# Patient Record
Sex: FEMALE | Race: WHITE | Employment: OTHER | URBAN - METROPOLITAN AREA
[De-identification: names, ages, dates, MRNs, and addresses within clinical notes are randomized per-mention and may not be internally consistent; named-entity substitution may affect disease eponyms.]

---

## 2014-08-01 LAB — COLONOSCOPY, EXTERNAL: NORMAL

## 2015-09-01 LAB — AMB DEXA, EXTERNAL: NORMAL

## 2016-04-01 LAB — MAMMOGRAPHY, EXTERNAL: NORMAL

## 2017-06-22 DIAGNOSIS — E78.00 PURE HYPERCHOLESTEROLEMIA: ICD-10-CM

## 2017-08-23 PROBLEM — R55 NEAR SYNCOPE: Status: ACTIVE | Noted: 2017-08-23

## 2017-08-23 PROBLEM — M85.80 OSTEOPENIA: Status: ACTIVE | Noted: 2017-08-23

## 2017-08-23 PROBLEM — F41.0 PANIC: Status: ACTIVE | Noted: 2017-08-23

## 2017-08-23 PROBLEM — B35.1 ONYCHOMYCOSIS OF TOENAIL: Status: ACTIVE | Noted: 2017-08-23

## 2017-08-23 PROBLEM — R10.9 ABDOMINAL PAIN: Status: ACTIVE | Noted: 2017-08-23

## 2017-08-23 PROBLEM — E78.00 PURE HYPERCHOLESTEROLEMIA: Status: ACTIVE | Noted: 2017-08-23

## 2017-08-23 PROBLEM — L03.032 CELLULITIS OF TOE OF LEFT FOOT: Status: ACTIVE | Noted: 2017-08-23

## 2017-08-23 PROBLEM — M81.0 OSTEOPOROSIS: Status: ACTIVE | Noted: 2017-08-23

## 2017-08-23 PROBLEM — I10 HBP (HIGH BLOOD PRESSURE): Status: ACTIVE | Noted: 2017-08-23

## 2017-08-23 RX ORDER — MUPIROCIN 20 MG/G
OINTMENT TOPICAL 3 TIMES DAILY
COMMUNITY
End: 2019-01-21

## 2017-08-23 RX ORDER — IBANDRONATE SODIUM 150 MG/1
150 TABLET, FILM COATED ORAL
COMMUNITY
End: 2018-07-30 | Stop reason: SDUPTHER

## 2017-08-23 RX ORDER — ASPIRIN 81 MG/1
81 TABLET ORAL DAILY
COMMUNITY
End: 2019-01-21

## 2017-08-23 RX ORDER — SULFAMETHOXAZOLE AND TRIMETHOPRIM 800; 160 MG/1; MG/1
1 TABLET ORAL 2 TIMES DAILY
COMMUNITY
End: 2017-10-10 | Stop reason: ALTCHOICE

## 2017-08-23 RX ORDER — CEFDINIR 300 MG/1
300 CAPSULE ORAL 2 TIMES DAILY
COMMUNITY
End: 2017-10-10 | Stop reason: ALTCHOICE

## 2017-08-23 RX ORDER — LANOLIN ALCOHOL/MO/W.PET/CERES
500 CREAM (GRAM) TOPICAL DAILY
COMMUNITY
End: 2019-01-21

## 2017-08-23 RX ORDER — ALPRAZOLAM 0.25 MG/1
0.25 TABLET ORAL
COMMUNITY
End: 2017-10-10 | Stop reason: SDUPTHER

## 2017-09-07 RX ORDER — AMOXICILLIN AND CLAVULANATE POTASSIUM 500; 125 MG/1; MG/1
1 TABLET, FILM COATED ORAL 2 TIMES DAILY
Qty: 14 TAB | Refills: 0 | Status: SHIPPED | OUTPATIENT
Start: 2017-09-07 | End: 2017-09-14

## 2017-10-05 ENCOUNTER — LAB ONLY (OUTPATIENT)
Dept: INTERNAL MEDICINE CLINIC | Age: 67
End: 2017-10-05

## 2017-10-05 DIAGNOSIS — Z20.5 EXPOSURE TO HEPATITIS C: ICD-10-CM

## 2017-10-05 DIAGNOSIS — Z00.00 ROUTINE GENERAL MEDICAL EXAMINATION AT A HEALTH CARE FACILITY: Primary | ICD-10-CM

## 2017-10-05 DIAGNOSIS — E78.00 PURE HYPERCHOLESTEROLEMIA: ICD-10-CM

## 2017-10-05 LAB
ALBUMIN SERPL-MCNC: 4.5 G/DL (ref 3.9–5.4)
ALKALINE PHOS POC: 95 U/L (ref 38–126)
ALT SERPL-CCNC: 26 U/L (ref 9–52)
AST SERPL-CCNC: 29 U/L (ref 14–36)
BACTERIA UA POCT, BACTPOCT: NORMAL
BILIRUB UR QL STRIP: NEGATIVE
BUN BLD-MCNC: 9 MG/DL (ref 7–17)
CALCIUM BLD-MCNC: 9.7 MG/DL (ref 8.4–10.2)
CASTS UA POCT: NORMAL
CHLORIDE BLD-SCNC: 107 MMOL/L (ref 98–107)
CHOLEST SERPL-MCNC: 210 MG/DL (ref 0–200)
CK (CPK) POC: 81 U/L (ref 30–135)
CLUE CELLS, CLUEPOCT: NEGATIVE
CO2 POC: 26 MMOL/L (ref 22–32)
CREAT BLD-MCNC: 0.6 MG/DL (ref 0.7–1.2)
CRYSTALS UA POCT, CRYSPOCT: NEGATIVE
EGFR (POC): 94.3
EPITHELIAL CELLS POCT: NORMAL
GLUCOSE POC: 100 MG/DL (ref 65–105)
GLUCOSE UR-MCNC: NEGATIVE MG/DL
GRAN# POC: 5.8 K/UL (ref 2–7.8)
GRAN% POC: 79.1 % (ref 37–92)
HCT VFR BLD CALC: 39.4 % (ref 37–51)
HDLC SERPL-MCNC: 70 MG/DL (ref 35–130)
HGB BLD-MCNC: 12.7 G/DL (ref 12–18)
IRON POC: 107 UG/DL (ref 37–170)
IRON SATURATION POC: 26 % (ref 15–55)
KETONES P FAST UR STRIP-MCNC: NEGATIVE MG/DL
LDL CHOLESTEROL POC: 124.2 MG/DL (ref 0–130)
LY# POC: 1.2 K/UL (ref 0.6–4.1)
LY% POC: 18.2 % (ref 10–58.5)
MCH RBC QN: 28.8 PG (ref 26–32)
MCHC RBC-ENTMCNC: 32.1 G/DL (ref 30–36)
MCV RBC: 90 FL (ref 80–97)
MID #, POC: 0.1 K/UL (ref 0–1.8)
MID% POC: 2.7 % (ref 0.1–24)
MUCUS UA POCT, MUCPOCT: NORMAL
PH UR STRIP: 6 [PH] (ref 5–7)
PLATELET # BLD: 282 K/UL (ref 140–440)
POTASSIUM SERPL-SCNC: 4.1 MMOL/L (ref 3.6–5)
PROT SERPL-MCNC: 7 G/DL (ref 6.3–8.2)
PROT UR QL STRIP: NEGATIVE MG/DL
RBC # BLD: 4.39 M/UL (ref 4.2–6.3)
RBC UA POCT, RBCPOCT: NORMAL
SODIUM SERPL-SCNC: 144 MMOL/L (ref 137–145)
SP GR UR STRIP: 1.01 (ref 1.01–1.02)
TCHOL/HDL RATIO (POC): 3 (ref 0–4)
TIBC POC: 418 UG/DL (ref 265–497)
TOTAL BILIRUBIN POC: 0.6 MG/DL (ref 0.2–1.3)
TRICH UA POCT, TRICHPOC: NEGATIVE
TRIGL SERPL-MCNC: 79 MG/DL (ref 0–200)
TSH BLD-ACNC: 2.34 UIU/ML (ref 0.4–4.2)
UA UROBILINOGEN AMB POC: NORMAL (ref 0.2–1)
URINALYSIS CLARITY POC: CLEAR
URINALYSIS COLOR POC: NORMAL
URINE BLOOD POC: NEGATIVE
URINE CULT COMMENT, POCT: NORMAL
URINE LEUKOCYTES POC: NORMAL
URINE NITRITES POC: NEGATIVE
VITAMIN D POC: 26.5 NG/ML (ref 30–96)
VLDLC SERPL CALC-MCNC: 15.8 MG/DL
WBC # BLD: 7.1 K/UL (ref 4.1–10.9)
WBC UA POCT, WBCPOCT: 0
YEAST UA POCT, YEASTPOC: NEGATIVE

## 2017-10-06 LAB — HCV AB S/CO SERPL IA: <0.1 S/CO RATIO (ref 0–0.9)

## 2017-10-10 ENCOUNTER — OFFICE VISIT (OUTPATIENT)
Dept: INTERNAL MEDICINE CLINIC | Age: 67
End: 2017-10-10

## 2017-10-10 VITALS — TEMPERATURE: 97.6 F | HEART RATE: 69 BPM | SYSTOLIC BLOOD PRESSURE: 139 MMHG | DIASTOLIC BLOOD PRESSURE: 92 MMHG

## 2017-10-10 DIAGNOSIS — Z23 ENCOUNTER FOR IMMUNIZATION: ICD-10-CM

## 2017-10-10 DIAGNOSIS — Z00.00 ANNUAL PHYSICAL EXAM: Primary | ICD-10-CM

## 2017-10-10 RX ORDER — ALPRAZOLAM 0.25 MG/1
0.25 TABLET ORAL
Qty: 30 TAB | Refills: 2 | Status: SHIPPED | OUTPATIENT
Start: 2017-10-10 | End: 2018-05-22 | Stop reason: SDUPTHER

## 2017-10-10 NOTE — LETTER
10/10/2017 12:14 PM 
 
Ms. Nestor Hernandez [facility] [id number] Dear Kiel Perez: 
 
I am writing this letter as a follow-up to your recent physical examination. I have enclosed copies of your lab work for your review. After going over this information, if you have any questions please give me a call. Your current active and past medical problems include: 
 
1. Labile/white coat hypertension. 2. Recurrent syncope, for which you've had a negative cardiac evaluation. 3. Osteopenia. 4. Fibrocystic breast disease, for which you've had a right breast biopsy. 5. You have gallstones, which are asymptomatic. 
6. You have a simple cyst in your right kidney. Your current medications are: 1. Xanax 0.25 mg as needed. 2. Aspirin 81 mg daily. 3. Vitamin B12 500 mcg daily. 4. Boniva 150 mg monthly. On physical examination, your height was 5'4\". Your weight was 156. BMI 27. Your blood pressure was 160/80. Your pulse was 70 and regular. Your physical examination was normal except for the multiple skin lesions that were recently treated by your dermatologist.  Your resting electrocardiogram, pulmonary function testing and hearing testing were all normal. 
 
Your laboratory studies revealed your hemoglobin and iron stores to be normal.  Your blood sugar, potassium, kidney function, calcium level and liver function tests were normal.   Total cholesterol was 210, triglycerides were 79, LDL (bad cholesterol) was 124 and HDL (good cholesterol) was 70. This remains a low risk profile, but it is not nearly as good as it was a year ago when you weren't a vegan. I recommend you regain your sanity and start eating a normal diet. Thyroid function is normal.  Vitamin D level is almost normal at 26.5. Please continue your daily supplement of 1,000 units. UA is normal and screening for hepatitis C virus was negative. You are overdue for your mammogram, so go ahead and schedule that.   The day you were in the office we updated your influenza vaccine, gave you the second vaccine to prevent pneumococcal pneumonia, called Pneumovax 23. You had previously had Prevnar 13. That completes the course of treatment to prevent pneumococcal pneumonia. Shingles vaccine was given in 2012 and no booster is recommended for that. Tetanus and whooping cough vaccines are also current. You had a normal colonoscopy in 2014 and repeat was recommended in 2024 since you are low risk. You had a CT scan of the heart in 2012. Your coronary artery calcium score was 0, which is perfect. That does not need to be repeated. After you've taken Boniva for two full years we will repeat your bone density test. 
 
This is an excellent report. You are quite healthy. I look forward to seeing you again in one year or sooner should the need arise. Sincerely, Annetta Martínez MD

## 2017-10-10 NOTE — PROGRESS NOTES
Jorden Perez comes to the office today for a comprehensive medical evaluation. Past Medical History:  1. G2, P2, AB0.  2. Labile/white coat hypertension. 3. Vasovagal syncope x two, status post negative cardiac evaluation. 4. Osteopenia, currently being treated with Boniva. 5. Hypercholesterolemia. 6. Fibrocystic breast disease, status post lumpectomy right breast.  7. Migraine headaches. 8. Asymptomatic cholelithiasis. 9. Simple right renal cyst.    Current Medications:  1. Citracal with vitamin D, one daily  2. Biotin 5,000 units daily. 3. Vitamin B12 500 units daily. 4. Aspirin 81 mg daily. 5. Xanax 0.25 mg prn. She says she rarely takes that. 6. Boniva 150 mg monthly. She's had that for about five months. Drug Allergies:  None. Social History:  She is . She is a social drinker. She is a lifelong nonsmoker. She is a vegan. Family History:  Her mother  in her 97J with complications of heart disease, PVD and COPD. Father  from complications of dementia. She has one sister in good health. No family history of ovarian, breast, colon cancer or colon polyps. Review of Systems:  Jorden Perez is feeling well. She has not had any symptoms of dizziness, syncope or near syncope. She monitors her blood pressure at home and always gets normal readings. She denies exertional chest pain, chest pressure, chest tightness, shortness of breath, PND, orthopnea or ankle edema. Remainder of the ROS is negative. Physical Examination:  GENERAL:  HT: 5'4\". WT: 156. BMI: 27.0. BP: 139/92 initially, subsequently 160/80. P: 70 and regular. T: 97.6. P:  and regular. HEENT:  Head normocephalic, atraumatic. Eyes - pupils midrange, equal directly and consensually. Extraocular movements are normal.  Ears, nose and throat are normal.  NECK:  Without JVD, adenopathy, thyromegaly or carotid bruits. LUNGS:  Clear. BREASTS:  Without masses. HEART:   Quiet precordium. Regular rate and rhythm.   No murmurs or gallops. ABDOMEN:  Soft, non tender. No detectable hepatosplenomegaly, abdominal masses or bruits. EXTREMITIES:  Without edema. Pulses are normal.  SKIN:  She has multiple lesions treated  yesterday by her dermatologist.  NEUROLOGICAL:  Cranial nerves II-XII are intact. Strength, gait and mental status are normal for age. Studies:  EKG - normal sinus rhythm, within normal limits. Hearing testing is normal.  Pulmonary function testing is normal.    Laboratory:  HGB and iron stores are normal. Blood sugar, potassium, kidney function, calcium level and LFTs are normal.  Total cholesterol 210, triglycerides 79, , HDL 70. TSH 2.3. Vitamin D level 26.5. Hep C antibody negative. UA is normal.    Impression:  1. Labile/white coat hypertension. 2. Osteopenia. 3. Fibrocystic breast disease. 4. Asymptomatic cholelithiasis. 5. History of recurrent vasovagal syncope. 6. Simple right renal cyst.    Plan:  1. Continue current medication regimen as outlined. 2. After she's been on Boniva for two years we'll repeat her bone density scan. 3. She had a normal colonoscopy in 2014, repeat recommended in 2024. 4. CT scan of the heart showed a coronary artery calcium score of 0 in 2012. No repeat recommended at this time. 5. Mammogram is due and she will schedule that. 6. Pneumovax 23, influenza vaccine given today. She's had Prevnar 13, Tdap and Zostavax. 7. Follow up annually or sooner prn.

## 2017-10-10 NOTE — PROGRESS NOTES
Reviewed record in preparation for visit and have obtained necessary documentation. Identified pt with two pt identifiers(name and ). Chief Complaint   Patient presents with    Annual Exam        Coordination of Care Questionnaire:  :     1) Have you been to an emergency room, urgent care clinic since your last visit? no    Hospitalized since your last visit? No             2) Have you seen or consulted any other health care providers outside of [address] [place] [address] since your last visit?  No

## 2017-12-20 DIAGNOSIS — J40 BRONCHITIS: Primary | ICD-10-CM

## 2017-12-20 RX ORDER — AZITHROMYCIN 250 MG/1
TABLET, FILM COATED ORAL
Qty: 6 TAB | Refills: 0 | Status: SHIPPED | OUTPATIENT
Start: 2017-12-20 | End: 2017-12-23 | Stop reason: SDUPTHER

## 2017-12-20 RX ORDER — CODEINE PHOSPHATE AND GUAIFENESIN 10; 100 MG/5ML; MG/5ML
5 SOLUTION ORAL
Qty: 240 ML | Refills: 2 | Status: SHIPPED | OUTPATIENT
Start: 2017-12-20 | End: 2018-09-21 | Stop reason: SDUPTHER

## 2017-12-23 DIAGNOSIS — J40 BRONCHITIS: ICD-10-CM

## 2017-12-23 RX ORDER — AZITHROMYCIN 250 MG/1
TABLET, FILM COATED ORAL
Qty: 6 TAB | Refills: 0 | Status: SHIPPED | OUTPATIENT
Start: 2017-12-23 | End: 2017-12-28

## 2018-03-26 ENCOUNTER — OFFICE VISIT (OUTPATIENT)
Dept: INTERNAL MEDICINE CLINIC | Age: 68
End: 2018-03-26

## 2018-03-26 VITALS
TEMPERATURE: 97.9 F | OXYGEN SATURATION: 98 % | SYSTOLIC BLOOD PRESSURE: 154 MMHG | DIASTOLIC BLOOD PRESSURE: 91 MMHG | WEIGHT: 160 LBS | HEART RATE: 87 BPM

## 2018-03-26 DIAGNOSIS — I10 ESSENTIAL HYPERTENSION: Primary | ICD-10-CM

## 2018-03-26 PROBLEM — R42 VERTIGO: Status: ACTIVE | Noted: 2018-03-26

## 2018-03-26 RX ORDER — MECLIZINE HYDROCHLORIDE 25 MG/1
25 TABLET ORAL
Qty: 30 TAB | Refills: 3 | Status: SHIPPED | OUTPATIENT
Start: 2018-03-26 | End: 2018-04-05

## 2018-03-26 NOTE — PROGRESS NOTES
Reviewed record in preparation for visit and have obtained necessary documentation. Identified pt with two pt identifiers(name and ). Chief Complaint   Patient presents with    Dizziness        Coordination of Care Questionnaire:  :     1) Have you been to an emergency room, urgent care clinic since your last visit? No     Hospitalized since your last visit? No               2) Have you seen or consulted any other health care providers outside of [address] [place] [address] since your last visit?  No

## 2018-03-26 NOTE — PROGRESS NOTES
Subjective:  Stewart Wilkes comes to the office today complaining of dizziness. On Saturday night she attended a venue that was quite loud. This resulted in significant tinnitus in both ears. The next day she turned her head quickly and developed acute vertigo. She had to lie down in bed for about 20 minutes, then it resolved. She has noted this on 2-3 other occasions, always in association with abrupt head movement. Physical Examination:  GENERAL: T: 97.9. BP: initially 154/90, subsequently 120/80. P: 80 and regular. O2 sat on room air: 98%. WT: 160. She is awake, alert and oriented x four, in no distress. HEENT:  Eyes - Pupils midrange, equal directly and consensually. EOMs normal.  No nystagmus. Ears are clear. She does have scarring of the TMs from previous tympanotomy tubes. Faces symmetric. NECK:  Without jugular venous distention, adenopathy, thyromegaly or carotid bruits. CHEST:  Lungs clear. CARDIAC:  Heart regular rhythm without murmurs or gallops. NEURO:  Exam is normal.  HEARING:  Hearing testing shows symmetrical high frequency hearing loss in each ear. Impression:  1. Positional vertigo. Plan:  1. I gave her some vestibular exercises to do. 2. Recommended Meclizine 25 mg t.i.d. prn.  3. Follow up as previously scheduled or sooner prn.

## 2018-05-22 DIAGNOSIS — F41.9 ANXIETY: Primary | ICD-10-CM

## 2018-05-22 RX ORDER — ALPRAZOLAM 0.25 MG/1
0.25 TABLET ORAL
Qty: 30 TAB | Refills: 2 | Status: SHIPPED | OUTPATIENT
Start: 2018-05-22 | End: 2019-01-21

## 2018-05-22 NOTE — TELEPHONE ENCOUNTER
Requested Prescriptions     Pending Prescriptions Disp Refills    ALPRAZolam (XANAX) 0.25 mg tablet 30 Tab 2     Sig: Take 1 Tab by mouth three (3) times daily as needed for Anxiety. Max Daily Amount: 0.75 mg.

## 2018-07-12 RX ORDER — CYCLOBENZAPRINE HCL 5 MG
TABLET ORAL
Qty: 30 TAB | Refills: 0 | Status: SHIPPED | OUTPATIENT
Start: 2018-07-12 | End: 2019-01-21

## 2018-07-12 NOTE — TELEPHONE ENCOUNTER
Patient phoned office states lifted heavy pictures yesterday   Experienced spasm in back moving around to chest after lifting   Dr. Melva Lauren sent rx for Flexeril and advised patient to call if no improvement and come in to office to be seen

## 2018-07-30 DIAGNOSIS — J02.9 PHARYNGITIS, UNSPECIFIED ETIOLOGY: Primary | ICD-10-CM

## 2018-07-30 DIAGNOSIS — M81.0 AGE-RELATED OSTEOPOROSIS WITHOUT CURRENT PATHOLOGICAL FRACTURE: ICD-10-CM

## 2018-07-30 RX ORDER — AZITHROMYCIN 250 MG/1
TABLET, FILM COATED ORAL
Qty: 6 TAB | Refills: 0 | Status: SHIPPED | OUTPATIENT
Start: 2018-07-30 | End: 2018-08-04

## 2018-07-30 RX ORDER — IBANDRONATE SODIUM 150 MG/1
150 TABLET, FILM COATED ORAL
Qty: 4 TAB | Refills: 3 | Status: SHIPPED | OUTPATIENT
Start: 2018-07-30 | End: 2019-01-21

## 2018-09-21 ENCOUNTER — OFFICE VISIT (OUTPATIENT)
Dept: INTERNAL MEDICINE CLINIC | Age: 68
End: 2018-09-21

## 2018-09-21 VITALS
HEART RATE: 88 BPM | TEMPERATURE: 98 F | OXYGEN SATURATION: 96 % | DIASTOLIC BLOOD PRESSURE: 76 MMHG | SYSTOLIC BLOOD PRESSURE: 130 MMHG

## 2018-09-21 DIAGNOSIS — R05.9 COUGH: Primary | ICD-10-CM

## 2018-09-21 DIAGNOSIS — J40 BRONCHITIS: ICD-10-CM

## 2018-09-21 DIAGNOSIS — J18.9 COMMUNITY ACQUIRED PNEUMONIA OF LEFT LOWER LOBE OF LUNG: ICD-10-CM

## 2018-09-21 DIAGNOSIS — R07.1 CHEST PAIN ON BREATHING: ICD-10-CM

## 2018-09-21 RX ORDER — CODEINE PHOSPHATE AND GUAIFENESIN 10; 100 MG/5ML; MG/5ML
5 SOLUTION ORAL
Qty: 240 ML | Refills: 2 | Status: SHIPPED | OUTPATIENT
Start: 2018-09-21 | End: 2019-01-21

## 2018-09-21 RX ORDER — LEVOFLOXACIN 750 MG/1
750 TABLET ORAL DAILY
Qty: 5 TAB | Refills: 0 | Status: SHIPPED | OUTPATIENT
Start: 2018-09-21 | End: 2019-01-21 | Stop reason: ALTCHOICE

## 2018-09-21 NOTE — PROGRESS NOTES
Reviewed record in preparation for visit and have obtained necessary documentation. Identified pt with two pt identifiers(name and ). Chief Complaint   Patient presents with    Breathing Problem        Coordination of Care Questionnaire:  :     1) Have you been to an emergency room, urgent care clinic since your last visit? No     Hospitalized since your last visit? No               2) Have you seen or consulted any other health care providers outside of [address] [place] [address] since your last visit?  No

## 2018-09-21 NOTE — PROGRESS NOTES
Subjective:  [address] Lobato has had nasal and sinus congestion, sore throat and cough since Monday, the 17th. This morning she woke up with severe pain in her chest.  It was somewhat pleuritic. She took some Advil and a muscle relaxant. She said it helped. Physical Examination:  GENERAL:  T: 98.5. BP: 130/76. P: 88 and regular. R: 18.  O2 sat on room air: 96%. HEENT:  Unremarkable. NECK:  Without adenopathy or stridor. CHEST:  Lungs clear. CARDIAC:  Heart regular rhythm without murmurs or gallops. Studies:  Chest xray - normal heart size, subsegmental atelectasis or infiltrate at the left base. There is slight eventration of the diaphragm with a left pleural thickening. Impression:  1. I do not have another xray to compare this to. I suspect the changes are chronic, but given her symptomatology I am going treat her for community acquired pneumonia. Plan:  1. Levaquin 750, (#5), one daily. 2. Cheratussin AC as needed for cough. 3. She's appointed for a full review on October 24th.

## 2018-10-17 ENCOUNTER — LAB ONLY (OUTPATIENT)
Dept: INTERNAL MEDICINE CLINIC | Age: 68
End: 2018-10-17

## 2018-10-17 DIAGNOSIS — M81.0 AGE-RELATED OSTEOPOROSIS WITHOUT CURRENT PATHOLOGICAL FRACTURE: Primary | ICD-10-CM

## 2018-10-17 DIAGNOSIS — Z00.00 ANNUAL PHYSICAL EXAM: Primary | ICD-10-CM

## 2018-10-17 LAB
ALBUMIN SERPL-MCNC: 4.8 G/DL (ref 3.9–5.4)
ALKALINE PHOS POC: 81 U/L (ref 38–126)
ALT SERPL-CCNC: 34 U/L (ref 9–52)
AST SERPL-CCNC: 34 U/L (ref 14–36)
BACTERIA UA POCT, BACTPOCT: NORMAL
BILIRUB UR QL STRIP: NEGATIVE
BUN BLD-MCNC: 12 MG/DL (ref 7–17)
CALCIUM BLD-MCNC: 10.2 MG/DL (ref 8.4–10.2)
CASTS UA POCT: NORMAL
CHLORIDE BLD-SCNC: 105 MMOL/L (ref 98–107)
CHOLEST SERPL-MCNC: 229 MG/DL (ref 0–200)
CLUE CELLS, CLUEPOCT: NEGATIVE
CO2 POC: 27 MMOL/L (ref 22–32)
CREAT BLD-MCNC: 0.6 MG/DL (ref 0.7–1.2)
CRYSTALS UA POCT, CRYSPOCT: NEGATIVE
EGFR (POC): 93
EPITHELIAL CELLS POCT: NORMAL
GLUCOSE POC: 103 MG/DL (ref 65–105)
GLUCOSE UR-MCNC: NEGATIVE MG/DL
GRAN# POC: 4.4 K/UL (ref 2–7.8)
GRAN% POC: 73.6 % (ref 37–92)
HCT VFR BLD CALC: 44.1 % (ref 37–51)
HDLC SERPL-MCNC: 76 MG/DL (ref 35–130)
HGB BLD-MCNC: 14.4 G/DL (ref 12–18)
IRON POC: 81 UG/DL (ref 37–170)
IRON SATURATION POC: 14 % (ref 15–55)
KETONES P FAST UR STRIP-MCNC: NEGATIVE MG/DL
LDL CHOLESTEROL POC: 136.8 MG/DL (ref 0–130)
LY# POC: 1.3 K/UL (ref 0.6–4.1)
LY% POC: 23.5 % (ref 10–58.5)
MCH RBC QN: 29.3 PG (ref 26–32)
MCHC RBC-ENTMCNC: 32.6 G/DL (ref 30–36)
MCV RBC: 90 FL (ref 80–97)
MID #, POC: 0.1 K/UL (ref 0–1.8)
MID% POC: 2.9 % (ref 0.1–24)
MUCUS UA POCT, MUCPOCT: NORMAL
PH UR STRIP: 6 [PH] (ref 5–7)
PLATELET # BLD: 304 K/UL (ref 140–440)
POTASSIUM SERPL-SCNC: 4.8 MMOL/L (ref 3.6–5)
PROT SERPL-MCNC: 7.6 G/DL (ref 6.3–8.2)
PROT UR QL STRIP: NEGATIVE
RBC # BLD: 4.91 M/UL (ref 4.2–6.3)
RBC UA POCT, RBCPOCT: NORMAL
SODIUM SERPL-SCNC: 145 MMOL/L (ref 137–145)
SP GR UR STRIP: 1.02 (ref 1.01–1.02)
TCHOL/HDL RATIO (POC): 3 (ref 0–4)
TIBC POC: 562 UG/DL (ref 265–497)
TOTAL BILIRUBIN POC: 0.8 MG/DL (ref 0.2–1.3)
TRICH UA POCT, TRICHPOC: NEGATIVE
TRIGL SERPL-MCNC: 81 MG/DL (ref 0–200)
UA UROBILINOGEN AMB POC: NORMAL (ref 0.2–1)
URINALYSIS CLARITY POC: CLEAR
URINALYSIS COLOR POC: YELLOW
URINE BLOOD POC: NEGATIVE
URINE CULT COMMENT, POCT: NORMAL
URINE LEUKOCYTES POC: NORMAL
URINE NITRITES POC: NEGATIVE
VLDLC SERPL CALC-MCNC: 16.2 MG/DL
WBC # BLD: 5.8 K/UL (ref 4.1–10.9)
WBC UA POCT, WBCPOCT: NORMAL
YEAST UA POCT, YEASTPOC: NEGATIVE

## 2018-10-18 LAB
TSH BLD-ACNC: 1.93 UIU/ML (ref 0.4–4.2)
VITAMIN D POC: 18.4 NG/ML (ref 30–96)

## 2018-10-24 ENCOUNTER — OFFICE VISIT (OUTPATIENT)
Dept: INTERNAL MEDICINE CLINIC | Age: 68
End: 2018-10-24

## 2018-10-24 VITALS
DIASTOLIC BLOOD PRESSURE: 90 MMHG | BODY MASS INDEX: 26.33 KG/M2 | OXYGEN SATURATION: 97 % | HEIGHT: 65 IN | TEMPERATURE: 98 F | WEIGHT: 158 LBS | HEART RATE: 76 BPM | SYSTOLIC BLOOD PRESSURE: 150 MMHG

## 2018-10-24 DIAGNOSIS — Z00.00 ANNUAL PHYSICAL EXAM: Primary | ICD-10-CM

## 2018-10-24 DIAGNOSIS — Z23 ENCOUNTER FOR IMMUNIZATION: ICD-10-CM

## 2018-10-24 DIAGNOSIS — E78.5 DYSLIPIDEMIA: ICD-10-CM

## 2018-10-24 RX ORDER — ATORVASTATIN CALCIUM 20 MG/1
20 TABLET, FILM COATED ORAL DAILY
Qty: 90 TAB | Refills: 3 | Status: SHIPPED | OUTPATIENT
Start: 2018-10-24 | End: 2019-11-05 | Stop reason: SDUPTHER

## 2018-10-24 NOTE — PROGRESS NOTES
Chief Complaint Patient presents with  Complete Physical  
 Annual Wellness Visit Depression Risk Factor Screening: PHQ over the last two weeks 10/24/2018 Little interest or pleasure in doing things Not at all Feeling down, depressed, irritable, or hopeless Not at all Total Score PHQ 2 0 Functional Ability and Level of Safety: Activities of Daily Living ADL Assessment 10/24/2018 Feeding yourself No Help Needed Getting from bed to chair No Help Needed Getting dressed No Help Needed Bathing or showering No Help Needed Walk across the room (includes cane/walker) No Help Needed Using the telphone No Help Needed Taking your medications No Help Needed Preparing meals No Help Needed Managing money (expenses/bills) No Help Needed Moderately strenuous housework (laundry) No Help Needed Shopping for personal items (toiletries/medicines) No Help Needed Shopping for groceries No Help Needed Driving No Help Needed Climbing a flight of stairs No Help Needed Getting to places beyond walking distances No Help Needed Fall Risk Fall Risk Assessment, last 12 mths 10/24/2018 Able to walk? Yes Fall in past 12 months? No  
 
 
Abuse Screen Abuse Screening Questionnaire 10/24/2018 Do you ever feel afraid of your partner? Orange Rota Are you in a relationship with someone who physically or mentally threatens you? Isiah Rota Is it safe for you to go home? Vera Peacock Patient Care Team  
Patient Care Team: 
Tom Pelaez MD as PCP - General (Internal Medicine)

## 2018-10-24 NOTE — PROGRESS NOTES
Sarabjit Fox comes to the office today for a comprehensive medical evaluation. 
  
Past Medical History: 1. Labile/white coat hypertension. 2. Vasovagal syncope x two, status post negative cardiac evaluation. 3. Osteopenia, treated briefly with Boniva. 4. Hypercholesterolemia. 5. Fibrocystic breast disease, status post lumpectomy right breast. 
6. Asymptomatic cholelithiasis. 7. Simple right renal cyst. 
  
Current Medications: 1. Vitamin B12 500 mcg daily. 2. Aspirin 81 mg daily. 3. Xanax 0.25 mg prn. 
4. She just completed a course of Levaquin for bronchitis. 
  
Drug Allergies:  None. 
  
Social History:  She is . She is a social drinker. She is a lifelong nonsmoker. She is no longer a vegan. 
  
Family History:  Her mother  in her 86D with complications of heart disease, PVD and COPD. Father  well into his 23H from complications of dementia. She has one sister in good health. No family history of ovarian, breast, colon cancer or colon polyps. 
  
Review of Systems:  Sarabjit Fox is feeling well. She has had two syncopal episodes, but none recently. She is not dizzy. She monitors her blood pressure at home and always gets good readings. She denies exertional chest pain, chest pressure, chest tightness, shortness of breath, PND, orthopnea or ankle edema. She is very upset today. 
  
Physical Examination: 
GENERAL:  HT: 5'4\". WT: 158. BP: 160/90. P: 76 and regular. T: 98. HEENT:  Head normocephalic, atraumatic. Eyes - pupils midrange, equal directly and consensually. Extraocular movements are normal.  Ears, nose and throat are normal. 
NECK:  Without JVD, adenopathy, thyromegaly or carotid bruits. LUNGS:  Clear. BREASTS:  Without masses. HEART:   Quiet precordium. Regular rate and rhythm. No murmurs or gallops. ABDOMEN:  Soft, non tender. No detectable hepatosplenomegaly, abdominal masses, bruits or ascites. EXTREMITIES:  Without edema.   Pulses are normal. 
 SKIN:  No suspicious lesions. (She has had a dysplastic nevus excised from her abdominal wall. She is followed regularly by dermatology.) 
  
Studies:  EKG - normal sinus rhythm, within normal limits. 
  
Laboratory:  Hemoglobin is normal and iron stores are slightly low with a saturation of 14%. Blood sugar, potassium, calcium level and LFTs are normal.  Total cholesterol 229, triglycerides 81, , HDL 76. TSH 1.93. Vitamin D level is quite low at 18.4. Calculated 10 year risk for a stroke or heart attack is 11.1%. UA is normal. 
  
Impression: 1. Labile hypertension. 2. Osteopenia. (Bone density test done today shows a T score in the right femoral neck of -1.4, in the left femoral neck of -1.7. This is improved compared to her scan from 2015). Plan: 1. Mammogram is up to date. I'll get a copy of the report. 2. She had a normal colonoscopy in 2014, repeat in ten years. 3. She goes to the eye doctor once a year. 4. Flu vaccine given today. 5. She has had Pneumovax 23, Prevnar 13, TDAP, Zostavax, influenza vaccine. I will put in the letter a prescription for Shingrix. 10. Suzanne Dear will be transferring her records to [facility] and we will certainly cooperate in that endeavor. 7. I am going to start Atorvastatin 20 mg daily. I told her she should recheck her blood work in two months. All screenings were reviewed and results discussed with the patient, who verbalized understand and agreement with the plans. A copy of the after visit summary with a personalized health plan was provided to the patient on the day of the visit.

## 2018-10-24 NOTE — LETTER
10/24/2018 12:45 PM 
 
Ms. Nestor Hernandez [address] [id number] Dear Wiley Alvarez: 
  
I am writing this letter as a follow-up to your recent physical examination. I have enclosed copies of your lab work for your review. After going over this information, if you have any questions please give me a call.  
  
Your current active and past medical problems include: 
1. Labile/white coat hypertension. 2. You have had several fainting episodes due to vasovagal syncope. You have had extensive cardiac evaluation, all of which was normal. 
3. Osteopenia. You have taken some Boniva and apparently you have taken enough because your bone density has improved. 4. Hypercholesterolemia. 5. Fibrocystic breast disease, for which you have had a biopsy. 6. You have a solitary gallstone noted on CT scanning that is not causing you any symptoms. 7. You have a simple cyst in your right kidney. 8. Vitamin D deficiency. 
  
Your current medications are: 1. Xanax 0.25 mg daily. 2. Please resume vitamin D 2,000 units daily. 3. Atorvastatin 20 mg daily. (Based on your cholesterol reading, your age and blood pressure, your calculated ten year risk for stroke or heart attack is 11.1%. Current recommendations are that you take a statin drug if your risk is 7.5% or greater.) 
  On physical examination, your height was 5'4\". Your weight was 158. Your blood pressure was 150/90. Temperature 98. Your pulse was 76 and regular. Your physical examination was entirely normal except for your blood pressure. Please monitor that daily at home for the next week and before I ride off into the sunset let me know how it is doing. Your resting electrocardiogram is normal.  Your bone density test, done while you were in the office, shows a T score in your right femoral neck of -1.4, in your left femoral neck -1.7. This is improved compared to your scan of 2015.   You are not in a range that you need treatment other than to replete your vitamin D. I would suggest you re-scan in approximately two years. 
  
Your laboratory studies revealed your hemoglobin is normal.  Your iron stores are slightly low. You do not need a supplement at this point. Total cholesterol was 229, triglycerides were 81, LDL (bad cholesterol) was 137 and HDL (good cholesterol) was 76. As noted above, we calculated a risk for stroke or heart attack at 11.1% and we have started Atorvastatin. You did have a CT scan of your heart done in 2012. At that time your coronary artery calcium score was 0, which is perfect. I do not think you need to have that scan done again. Your vitamin D level was low at 18, so please take 2,000 units of vitamin D3 daily. Thyroid function and urinalysis are normal.  All chemistries, including blood sugar, potassium, kidney function, calcium level and liver function tests are normal. 
 
We just did your bone density test.  Your mammogram is up to date. You had a normal colonoscopy in 2014. Since you are low risk you can wait ten years to repeat that. Your eye exam is current. 
  
You are most kind to write such a nice letter for me. I certainly appreciate it. It has been my pleasure to take care of you and your mom and dad all these years. I have given you my cell phone number. I expect you to use it anytime you have a question. I mean that. Go to [facility] and establish yourself with a doctor, sign a release, send it to us and we will transfer your records. Since we are starting Atorvastatin you need to recheck your lab work in two months. We updated your influenza vaccine. You have had both pneumonia vaccines, tetanus, whooping cough and the older shingles vaccine. There is a new vaccine to prevent shingles called Shingrix. I am going to send a prescription with this letter. Take it to [facility] and they will administer it. I wish you and Pardeep Resendiz the best of luck in the future.   I look forward to seeing you soon. Sincerely, Patsy Hurst MD

## 2018-10-24 NOTE — PATIENT INSTRUCTIONS
Vaccine Information Statement Influenza (Flu) Vaccine (Inactivated or Recombinant): What you need to know Many Vaccine Information Statements are available in [place] and other languages. See www.immunize.org/vis Hojas de Información Sobre Vacunas están disponibles en Español y en muchos otros idiomas. Visite www.immunize.org/vis 1. Why get vaccinated? Influenza (flu) is a contagious disease that spreads around the [place] every year, usually between October and May. Flu is caused by influenza viruses, and is spread mainly by coughing, sneezing, and close contact. Anyone can get flu. Flu strikes suddenly and can last several days. Symptoms vary by age, but can include: 
 fever/chills  sore throat  muscle aches  fatigue  cough  headache  runny or stuffy nose Flu can also lead to pneumonia and blood infections, and cause diarrhea and seizures in children. If you have a medical condition, such as heart or lung disease, flu can make it worse. Flu is more dangerous for some people. Infants and young children, people 72years of age and older, pregnant women, and people with certain health conditions or a weakened immune system are at greatest risk. Each year thousands of people in the [facility] die from flu, and many more are hospitalized. Flu vaccine can: 
 keep you from getting flu, 
 make flu less severe if you do get it, and 
 keep you from spreading flu to your family and other people. 2. Inactivated and recombinant flu vaccines A dose of flu vaccine is recommended every flu season. Children 6 months through 6years of age may need two doses during the same flu season. Everyone else needs only one dose each flu season.   
 
 
Some inactivated flu vaccines contain a very small amount of a mercury-based preservative called thimerosal. Studies have not shown thimerosal in vaccines to be harmful, but flu vaccines that do not contain thimerosal are available. There is no live flu virus in flu shots. They cannot cause the flu. There are many flu viruses, and they are always changing. Each year a new flu vaccine is made to protect against three or four viruses that are likely to cause disease in the upcoming flu season. But even when the vaccine doesnt exactly match these viruses, it may still provide some protection Flu vaccine cannot prevent: 
 flu that is caused by a virus not covered by the vaccine, or 
 illnesses that look like flu but are not. It takes about 2 weeks for protection to develop after vaccination, and protection lasts through the flu season. 3. Some people should not get this vaccine Tell the person who is giving you the vaccine:  If you have any severe, life-threatening allergies. If you ever had a life-threatening allergic reaction after a dose of flu vaccine, or have a severe allergy to any part of this vaccine, you may be advised not to get vaccinated. Most, but not all, types of flu vaccine contain a small amount of egg protein.  If you ever had Guillain-Barré Syndrome (also called GBS). Some people with a history of GBS should not get this vaccine. This should be discussed with your doctor.  If you are not feeling well. It is usually okay to get flu vaccine when you have a mild illness, but you might be asked to come back when you feel better. 4. Risks of a vaccine reaction With any medicine, including vaccines, there is a chance of reactions. These are usually mild and go away on their own, but serious reactions are also possible. Most people who get a flu shot do not have any problems with it. Minor problems following a flu shot include:  
 soreness, redness, or swelling where the shot was given  hoarseness  sore, red or itchy eyes  cough  fever  aches  headache  itching  fatigue If these problems occur, they usually begin soon after the shot and last 1 or 2 days. More serious problems following a flu shot can include the following:  There may be a small increased risk of Guillain-Barré Syndrome (GBS) after inactivated flu vaccine. This risk has been estimated at 1 or 2 additional cases per million people vaccinated. This is much lower than the risk of severe complications from flu, which can be prevented by flu vaccine.  Young children who get the flu shot along with pneumococcal vaccine (PCV13) and/or DTaP vaccine at the same time might be slightly more likely to have a seizure caused by fever. Ask your doctor for more information. Tell your doctor if a child who is getting flu vaccine has ever had a seizure. Problems that could happen after any injected vaccine:  People sometimes faint after a medical procedure, including vaccination. Sitting or lying down for about 15 minutes can help prevent fainting, and injuries caused by a fall. Tell your doctor if you feel dizzy, or have vision changes or ringing in the ears.  Some people get severe pain in the shoulder and have difficulty moving the arm where a shot was given. This happens very rarely.  Any medication can cause a severe allergic reaction. Such reactions from a vaccine are very rare, estimated at about 1 in a million doses, and would happen within a few minutes to a few hours after the vaccination. As with any medicine, there is a very remote chance of a vaccine causing a serious injury or death. The safety of vaccines is always being monitored. For more information, visit: www.cdc.gov/vaccinesafety/ 
 
5. What if there is a serious reaction? What should I look for?  Look for anything that concerns you, such as signs of a severe allergic reaction, very high fever, or unusual behavior.  
 
Signs of a severe allergic reaction can include hives, swelling of the face and throat, difficulty breathing, a fast heartbeat, dizziness, and weakness  usually within a few minutes to a few hours after the vaccination. What should I do?  If you think it is a severe allergic reaction or other emergency that cant wait, call 9-1-1 and get the person to the nearest hospital. Otherwise, call your doctor.  Reactions should be reported to the Vaccine Adverse Event Reporting System (VAERS). Your doctor should file this report, or you can do it yourself through  the VAERS web site at www.vaers. [facility].gov, or by calling 2-783.436.9529. VAERS does not give medical advice. 6. The National Vaccine Injury Compensation Program 
 
The [facility] Vaccine Injury Compensation Program (VICP) is a federal program that was created to compensate people who may have been injured by certain vaccines. Persons who believe they may have been injured by a vaccine can learn about the program and about filing a claim by calling 3-374.420.6615 or visiting the [employer] website at www.[facility].gov/vaccinecompensation. There is a time limit to file a claim for compensation. 7. How can I learn more?  Ask your healthcare provider. He or she can give you the vaccine package insert or suggest other sources of information.  Call your local or state health department.  Contact the Centers for Disease Control and Prevention (CDC): 
- Call 5-356.611.6351 (1-800-CDC-INFO) or 
- Visit CDCs website at www.cdc.gov/flu Vaccine Information Statement Inactivated Influenza Vaccine 8/7/2015 
[address] [employer] [id number] Department of [facility] [place] and [place] Centers for Disease Control and Prevention Office Use Only

## 2019-01-20 PROBLEM — K80.20 CALCULUS OF GALLBLADDER WITHOUT CHOLECYSTITIS WITHOUT OBSTRUCTION: Status: ACTIVE | Noted: 2019-01-20

## 2019-01-20 PROBLEM — R05.9 COUGH: Status: RESOLVED | Noted: 2018-09-21 | Resolved: 2019-01-20

## 2019-01-20 PROBLEM — E78.00 PURE HYPERCHOLESTEROLEMIA: Status: RESOLVED | Noted: 2017-08-23 | Resolved: 2019-01-20

## 2019-01-20 PROBLEM — R42 VERTIGO: Status: RESOLVED | Noted: 2018-03-26 | Resolved: 2019-01-20

## 2019-01-20 PROBLEM — B35.1 ONYCHOMYCOSIS OF TOENAIL: Status: RESOLVED | Noted: 2017-08-23 | Resolved: 2019-01-20

## 2019-01-20 PROBLEM — R07.1 CHEST PAIN ON BREATHING: Status: RESOLVED | Noted: 2018-09-21 | Resolved: 2019-01-20

## 2019-01-20 PROBLEM — E78.5 DYSLIPIDEMIA: Status: ACTIVE | Noted: 2019-01-20

## 2019-01-20 PROBLEM — N60.19 FIBROCYSTIC BREAST DISEASE: Status: ACTIVE | Noted: 2019-01-20

## 2019-01-20 PROBLEM — J18.9 COMMUNITY ACQUIRED PNEUMONIA OF LEFT LOWER LOBE OF LUNG: Status: RESOLVED | Noted: 2018-09-21 | Resolved: 2019-01-20

## 2019-01-20 PROBLEM — F41.0 PANIC: Status: RESOLVED | Noted: 2017-08-23 | Resolved: 2019-01-20

## 2019-01-20 PROBLEM — R10.9 ABDOMINAL PAIN: Status: RESOLVED | Noted: 2017-08-23 | Resolved: 2019-01-20

## 2019-01-20 PROBLEM — I10 HBP (HIGH BLOOD PRESSURE): Status: RESOLVED | Noted: 2017-08-23 | Resolved: 2019-01-20

## 2019-01-20 PROBLEM — R55 VASOVAGAL SYNCOPE: Status: ACTIVE | Noted: 2019-01-20

## 2019-01-20 PROBLEM — R09.89 LABILE HYPERTENSION: Status: ACTIVE | Noted: 2019-01-20

## 2019-01-20 PROBLEM — R55 NEAR SYNCOPE: Status: RESOLVED | Noted: 2017-08-23 | Resolved: 2019-01-20

## 2019-01-20 PROBLEM — L03.032 CELLULITIS OF TOE OF LEFT FOOT: Status: RESOLVED | Noted: 2017-08-23 | Resolved: 2019-01-20

## 2019-01-21 ENCOUNTER — OFFICE VISIT (OUTPATIENT)
Dept: INTERNAL MEDICINE CLINIC | Age: 69
End: 2019-01-21

## 2019-01-21 VITALS
DIASTOLIC BLOOD PRESSURE: 98 MMHG | SYSTOLIC BLOOD PRESSURE: 140 MMHG | RESPIRATION RATE: 16 BRPM | TEMPERATURE: 97.1 F | HEART RATE: 69 BPM | OXYGEN SATURATION: 98 % | HEIGHT: 65 IN | BODY MASS INDEX: 26.49 KG/M2 | WEIGHT: 159 LBS

## 2019-01-21 DIAGNOSIS — R09.89 LABILE HYPERTENSION: Primary | ICD-10-CM

## 2019-01-21 DIAGNOSIS — E78.5 DYSLIPIDEMIA: ICD-10-CM

## 2019-01-21 DIAGNOSIS — Z79.899 LONG-TERM CURRENT USE OF HIGH RISK MEDICATION OTHER THAN ANTICOAGULANT: ICD-10-CM

## 2019-01-21 DIAGNOSIS — S40.012A CONTUSION OF LEFT SHOULDER, INITIAL ENCOUNTER: ICD-10-CM

## 2019-01-21 LAB
ALBUMIN SERPL-MCNC: 4.5 G/DL (ref 3.9–5.4)
ALKALINE PHOS POC: 101 U/L (ref 38–126)
ALT SERPL-CCNC: 39 U/L (ref 9–52)
AST SERPL-CCNC: 28 U/L (ref 14–36)
BUN BLD-MCNC: 14 MG/DL (ref 7–17)
CALCIUM BLD-MCNC: 10.4 MG/DL (ref 8.4–10.2)
CHLORIDE BLD-SCNC: 107 MMOL/L (ref 98–107)
CHOLEST SERPL-MCNC: 140 MG/DL (ref 0–200)
CK (CPK) POC: 88 U/L (ref 30–135)
CO2 POC: 28 MMOL/L (ref 22–32)
CREAT BLD-MCNC: 0.6 MG/DL (ref 0.7–1.2)
EGFR (POC): 93
GLUCOSE POC: 112 MG/DL (ref 65–105)
HDLC SERPL-MCNC: 63 MG/DL (ref 35–130)
LDL CHOLESTEROL POC: 66 MG/DL (ref 0–130)
POTASSIUM SERPL-SCNC: 5.6 MMOL/L (ref 3.6–5)
PROT SERPL-MCNC: 7 G/DL (ref 6.3–8.2)
SODIUM SERPL-SCNC: 143 MMOL/L (ref 137–145)
TCHOL/HDL RATIO (POC): 2.2 (ref 0–4)
TOTAL BILIRUBIN POC: 0.7 MG/DL (ref 0.2–1.3)
TRIGL SERPL-MCNC: 55 MG/DL (ref 0–200)
VLDLC SERPL CALC-MCNC: 11 MG/DL

## 2019-01-21 NOTE — PROGRESS NOTES
K+ high. Probably a lab error. Needs repeat. BS and Liver and kidney function normal.  CK (muscle enzyme) normal. Lipids excellent with  and LDL 66 (want < 70).

## 2019-01-21 NOTE — PROGRESS NOTES
1. Have you been to the ER, urgent care clinic since your last visit? Hospitalized since your last visit? No 
 
2. Have you seen or consulted any other health care providers outside of the [facility] since your last visit? Include any pap smears or colon screening. No  
 
Chief Complaint Patient presents with  Hypertension  
  2 mo. f/u  Cholesterol Problem

## 2019-01-21 NOTE — PROGRESS NOTES
Subjective:  
April Murillo is a 70 y.o. female Chief Complaint Patient presents with  Hypertension  
  2 mo. f/u  Cholesterol Problem History of present illness:  Ms. Ochoa Brock returns in follow up. She was last seen by Dr. Mila Chow approximately three months ago and started on Atorvastatin. She has had no difficulties with that. She returns for recheck of her labs primarily. Her blood pressure has been somewhat labile and is currently borderline. I have asked her to check her blood pressure once or twice a week over the next couple of weeks and bring those results with her. Recently she lifted something and it hit her shoulder and she has developed a bump over the point of the left shoulder. X-rays reveal no evident fracture and I suspect this is a contusion with a hematoma and it either may resolve or just remain as that bony protuberance. She is not having pain. There is really nothing to do at this point. She denies other new issues. Patient Active Problem List  
 Diagnosis Date Noted  Dyslipidemia 01/20/2019 Priority: 1 - One  Labile hypertension 01/20/2019 Priority: 1 - One  Long-term current use of high risk medication other than anticoagulant 01/21/2019 Priority: 3 - Three  Vasovagal syncope 01/20/2019 Priority: 3 - Three  Calculus of gallbladder without cholecystitis without obstruction 01/20/2019 Priority: 3 - Three  Osteopenia 08/23/2017 Priority: 3 - Three  Fibrocystic breast disease 01/20/2019  Annual physical exam 10/10/2017 History reviewed. No pertinent surgical history. No Known Allergies Family History Problem Relation Age of Onset  Lung Disease Mother  Parkinsonism Father  Dementia Father Social History Socioeconomic History  Marital status:  Spouse name: Not on file  Number of children: Not on file  Years of education: Not on file  Highest education level: Not on file Social Needs  Financial resource strain: Not on file  Food insecurity - worry: Not on file  Food insecurity - inability: Not on file  Transportation needs - medical: Not on file  Transportation needs - non-medical: Not on file Occupational History  Not on file Tobacco Use  Smoking status: Never Smoker  Smokeless tobacco: Never Used Substance and Sexual Activity  Alcohol use: No  
  Frequency: Never  Drug use: No  
 Sexual activity: Yes  
  Partners: Male Birth control/protection: None Other Topics Concern  Not on file Social History Narrative  Not on file Outpatient Medications Marked as Taking for the 1/21/19 encounter (Office Visit) with Kenisha Anderson MD  
Medication Sig Dispense Refill  atorvastatin (LIPITOR) 20 mg tablet Take 1 Tab by mouth daily. 90 Tab 3 Review of Systems Constitutional:  She denies fever, weight loss, sweats or fatigue. HEENT:  No blurred or double vision, headache or dizziness. No difficulty with swallowing, taste, speech or smell. Respiratory:  No cough, wheezing or shortness of breath. No sputum production. Cardiac:  Denies chest pain, palpitations, unexplained indigestion, syncope, edema, PND or orthopnea. GI:  No changes in bowel movements, no abdominal pain, no bloating, anorexia, nausea, vomiting or heartburn. :  No frequency or dysuria. Denies incontinence. Extremities:  No joint pain, stiffness or swelling. Skin:  No recent rashes or mole changes. Neurological:  No numbness, tingling, burning paresthesias or loss of motor strength. No syncope, dizziness, frequent headaches or memory loss. Objective:  
 
Vitals:  
 01/21/19 0850 BP: (!) 140/98 Pulse: 69 Resp: 16 Temp: 97.1 °F (36.2 °C) TempSrc: Oral  
SpO2: 98% Weight: 159 lb (72.1 kg) Height: 5' 5\" (1.651 m) PainSc:   0 - No pain Body mass index is 26.46 kg/m². Physical Examination:  
           General Appearance:  Well-developed, well-nourished, no acute  distress. HEENT:   
 Ears:  The TMs and ear canals were clear. Eyes:  The pupillary responses were normal.  Extraocular muscle function intact. Lids and conjunctiva not injected. Neck:  Supple without thyromegaly or adenopathy. No JVD noted. Lungs:  Clear to auscultation and percussion. Cardiac:  Regular rate and rhythm without murmur. GI: nontender w/o mass. Normal BS's. Extremities: hard bony protuberance top left shoulder. Skin:  No rash or unusual mole changes noted. Neurological:  Grossly normal.     
 
 
 
Assessment/Plan:  
Impressions: ICD-10-CM ICD-9-CM 1. Labile hypertension R09.89 401.9 AMB POC COMPREHENSIVE METABOLIC PANEL 2. Dyslipidemia E78.5 272.4 AMB POC COMPREHENSIVE METABOLIC PANEL  
   AMB POC LIPID PROFILE 3. Contusion of left shoulder, initial encounter S40.012A 923.00 XR SHOULDER LT AP/LAT MIN 2 V  
4. Long-term current use of high risk medication other than anticoagulant Z79.899 V58.69 AMB POC CK (CPK) Plan: 1. Continue present meds 2. Discussed lifestyle modifications including Na restriction, low carb/fat diet, weight reduction and exercise (at least a walking program). Follow-up Disposition: 
Return in about 3 months (around 4/21/2019) for Fasting. Orders Placed This Encounter  XR SHOULDER LT AP/LAT MIN 2 V Standing Status:   Future Number of Occurrences:   1 Standing Expiration Date:   1/22/2020 Order Specific Question:   Reason for Exam  
  Answer:   contusion  AMB POC COMPREHENSIVE METABOLIC PANEL  
 AMB POC CK (CPK)  AMB POC LIPID PROFILE Teryl Baumgarten, MD

## 2019-01-23 ENCOUNTER — TELEPHONE (OUTPATIENT)
Dept: INTERNAL MEDICINE CLINIC | Age: 69
End: 2019-01-23

## 2019-01-23 NOTE — TELEPHONE ENCOUNTER
Patient informed Dr. Christy Lloyd has reviewed lab results and stated K+ high. Probably a lab error. Needs repeat. BS and Liver and kidney function normal.  CK (muscle enzyme) normal. Lipids excellent with  and LDL 66 (want < 70). Patient states she is going out of town tomorrow and can come in on Monday, 01/28/2019. She states she will call back to set up an appt time.

## 2019-01-23 NOTE — TELEPHONE ENCOUNTER
----- Message from Nelson Foster MD sent at 1/21/2019  4:53 PM EST -----  K+ high. Probably a lab error. Needs repeat. BS and Liver and kidney function normal.  CK (muscle enzyme) normal. Lipids excellent with  and LDL 66 (want < 70).

## 2019-01-28 ENCOUNTER — LAB ONLY (OUTPATIENT)
Dept: INTERNAL MEDICINE CLINIC | Age: 69
End: 2019-01-28

## 2019-01-28 DIAGNOSIS — R09.89 LABILE HYPERTENSION: Primary | ICD-10-CM

## 2019-01-28 LAB
BUN BLD-MCNC: 10 MG/DL (ref 7–17)
CALCIUM BLD-MCNC: 9.9 MG/DL (ref 8.4–10.2)
CHLORIDE BLD-SCNC: 106 MMOL/L (ref 98–107)
CO2 POC: 29 MMOL/L (ref 22–32)
CREAT BLD-MCNC: 0.6 MG/DL (ref 0.7–1.2)
EGFR (POC): 93
GLUCOSE POC: 104 MG/DL (ref 65–105)
POTASSIUM SERPL-SCNC: 4.9 MMOL/L (ref 3.6–5)
SODIUM SERPL-SCNC: 141 MMOL/L (ref 137–145)

## 2019-01-30 NOTE — PROGRESS NOTES
PHI verified. Patient informed that Dr. Manuel Garcia has reviewed lab results and stated K+ normal.  Previous high was lab error.  No change in Rx.

## 2019-03-19 ENCOUNTER — TELEPHONE (OUTPATIENT)
Dept: INTERNAL MEDICINE CLINIC | Age: 69
End: 2019-03-19

## 2019-03-20 NOTE — TELEPHONE ENCOUNTER
Patient called stating she had a salad Sunday night and got sick 6 hours later with vomiting and diarrhea. She has not had any further vomiting or diarrhea since Maurizio night/Monday morning. She states she feels a little weak, but not really tired nor having pain. Advised patient to increase her fluid intake and see if that helps her to feel better. If she does not feel better in a day or so, she should call back to let me know.

## 2019-04-12 ENCOUNTER — OFFICE VISIT (OUTPATIENT)
Dept: INTERNAL MEDICINE CLINIC | Age: 69
End: 2019-04-12

## 2019-04-12 VITALS
WEIGHT: 156.8 LBS | BODY MASS INDEX: 26.12 KG/M2 | HEIGHT: 65 IN | TEMPERATURE: 97.5 F | DIASTOLIC BLOOD PRESSURE: 88 MMHG | SYSTOLIC BLOOD PRESSURE: 148 MMHG | HEART RATE: 79 BPM | OXYGEN SATURATION: 96 %
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
## 2019-04-12 DIAGNOSIS — Z79.899 LONG-TERM CURRENT USE OF HIGH RISK MEDICATION OTHER THAN ANTICOAGULANT: ICD-10-CM

## 2019-04-12 DIAGNOSIS — E78.5 DYSLIPIDEMIA: Primary | ICD-10-CM

## 2019-04-12 DIAGNOSIS — J06.9 UPPER RESPIRATORY TRACT INFECTION, UNSPECIFIED TYPE: ICD-10-CM

## 2019-04-12 DIAGNOSIS — R09.89 LABILE HYPERTENSION: ICD-10-CM

## 2019-04-12 LAB
A-G RATIO,AGRAT: 1.9 RATIO
ALBUMIN SERPL-MCNC: 4.2 G/DL (ref 3.9–5.4)
ALP SERPL-CCNC: 81 U/L (ref 38–126)
ALT SERPL-CCNC: 20 U/L (ref 9–52)
ANION GAP SERPL CALC-SCNC: 12 MMOL/L
AST SERPL W P-5'-P-CCNC: 25 U/L (ref 14–36)
BILIRUB SERPL-MCNC: 0.7 MG/DL (ref 0.2–1.3)
BUN SERPL-MCNC: 8 MG/DL (ref 7–17)
BUN/CREATININE RATIO,BUCR: 13 RATIO
CALCIUM SERPL-MCNC: 9.3 MG/DL (ref 8.4–10.2)
CHLORIDE SERPL-SCNC: 107 MMOL/L (ref 98–107)
CHOL/HDL RATIO,CHHD: 2 RATIO (ref 0–4)
CHOLEST SERPL-MCNC: 113 MG/DL (ref 0–200)
CK SERPL-CCNC: 78 U/L (ref 30–135)
CO2 SERPL-SCNC: 26 MMOL/L (ref 22–32)
CREAT SERPL-MCNC: 0.6 MG/DL (ref 0.7–1.2)
GLOBULIN,GLOB: 2.2
GLUCOSE SERPL-MCNC: 95 MG/DL (ref 65–105)
HDLC SERPL-MCNC: 52 MG/DL (ref 35–130)
LDL/HDL RATIO,LDHD: 1 RATIO
LDLC SERPL CALC-MCNC: 51 MG/DL (ref 0–130)
POTASSIUM SERPL-SCNC: 4.2 MMOL/L (ref 3.6–5)
PROT SERPL-MCNC: 6.4 G/DL (ref 6.3–8.2)
SODIUM SERPL-SCNC: 145 MMOL/L (ref 137–145)
TRIGL SERPL-MCNC: 51 MG/DL (ref 0–200)
VLDLC SERPL CALC-MCNC: 10 MG/DL

## 2019-04-12 RX ORDER — VALSARTAN 80 MG/1
80 TABLET ORAL DAILY
Qty: 30 TAB | Refills: 5 | Status: SHIPPED | OUTPATIENT
Start: 2019-04-12 | End: 2019-11-01 | Stop reason: SDUPTHER

## 2019-04-12 RX ORDER — DOXYCYCLINE 100 MG/1
100 CAPSULE ORAL 2 TIMES DAILY
Qty: 20 CAP | Refills: 0 | Status: SHIPPED | OUTPATIENT
Start: 2019-04-12 | End: 2019-05-20 | Stop reason: ALTCHOICE

## 2019-04-12 NOTE — PROGRESS NOTES
Lipids excellent. LDL 51  Total cholesterol 113. Blood sugar, liver and kidney function normal.  CK (muscle enzyme) normal.  No change in medications or treatment.

## 2019-04-12 NOTE — PROGRESS NOTES
Subjective:  
So Garcia is a 70 y.o. female Chief Complaint Patient presents with  Hypertension  Cholesterol Problem History of present illness:  Ms. Marty Woodard returns in follow up. She is returning primarily for evaluation of her lipids, having been started on Atorvastatin. We are also rechecking her blood pressure, which remains elevated and we are going to try a small dose of Valsartan to see if that will help things there. Both the nurse and I got elevated blood pressures today. She recently had some food poisoning approximately two weeks ago with several hour history of nausea, vomiting and diarrhea, but this seems to have cleared. She has developed problems with increasing nasal congestion, postnasal drainage and some cough consistent with a URI. She has also had a tick bite close to her umbilicus. She has had some vague intermittent abdominal discomfort as well, but only transiently and occurring once or twice a day. She is not sure if it is musculoskeletal or not. She has had no other GI symptoms, including melena or hematochezia, nausea, vomiting, or specific tenderness. This may be related to her cough or possibly to the tick bite. Given the tick bite, we will treat her with Doxycycline, which should cover her URI as well. We have added the Valsartan and checked her labs today. She will recheck in a month's time. Patient Active Problem List  
 Diagnosis Date Noted  Dyslipidemia 01/20/2019 Priority: 1 - One  Labile hypertension 01/20/2019 Priority: 1 - One  Long-term current use of high risk medication other than anticoagulant 01/21/2019 Priority: 3 - Three  Vasovagal syncope 01/20/2019 Priority: 3 - Three  Calculus of gallbladder without cholecystitis without obstruction 01/20/2019 Priority: 3 - Three  Osteopenia 08/23/2017 Priority: 3 - Three  Fibrocystic breast disease 01/20/2019  Annual physical exam 10/10/2017 No past surgical history on file. No Known Allergies Family History Problem Relation Age of Onset  Lung Disease Mother  Parkinsonism Father  Dementia Father Social History Socioeconomic History  Marital status:  Spouse name: Not on file  Number of children: Not on file  Years of education: Not on file  Highest education level: Not on file Occupational History  Not on file Social Needs  Financial resource strain: Not on file  Food insecurity:  
  Worry: Not on file Inability: Not on file  Transportation needs:  
  Medical: Not on file Non-medical: Not on file Tobacco Use  Smoking status: Never Smoker  Smokeless tobacco: Never Used Substance and Sexual Activity  Alcohol use: No  
  Frequency: Never  Drug use: No  
 Sexual activity: Yes  
  Partners: Male Birth control/protection: None Lifestyle  Physical activity:  
  Days per week: Not on file Minutes per session: Not on file  Stress: Not on file Relationships  Social connections:  
  Talks on phone: Not on file Gets together: Not on file Attends [employer] service: Not on file Active member of club or organization: Not on file Attends meetings of clubs or organizations: Not on file Relationship status: Not on file  Intimate partner violence:  
  Fear of current or ex partner: Not on file Emotionally abused: Not on file Physically abused: Not on file Forced sexual activity: Not on file Other Topics Concern  Not on file Social History Narrative  Not on file Outpatient Medications Marked as Taking for the 4/12/19 encounter (Office Visit) with Allison Quiles MD  
Medication Sig Dispense Refill  doxycycline (VIBRAMYCIN) 100 mg capsule Take 1 Cap by mouth two (2) times a day. 20 Cap 0  
 valsartan (DIOVAN) 80 mg tablet Take 1 Tab by mouth daily. 30 Tab 5  atorvastatin (LIPITOR) 20 mg tablet Take 1 Tab by mouth daily. 90 Tab 3 Review of Systems Constitutional:  She denies fever, weight loss, sweats or fatigue. HEENT:  No blurred or double vision, headache or dizziness. No difficulty with swallowing, taste, speech or smell. Respiratory:  URI sxs Cardiac:  Denies chest pain, palpitations, unexplained indigestion, syncope, edema, PND or orthopnea. GI:  No changes in bowel movements, no abdominal pain, no bloating, anorexia, nausea, vomiting or heartburn. :  No frequency or dysuria. Denies incontinence. Extremities:  No joint pain, stiffness or swelling. Skin:  Tick bite umbilicus. Neurological:  No numbness, tingling, burning paresthesias or loss of motor strength. No syncope, dizziness, frequent headaches or memory loss. Objective:  
 
Vitals:  
 04/12/19 0758 BP: 148/88 Pulse: 79 Temp: 97.5 °F (36.4 °C) TempSrc: Oral  
SpO2: 96% Weight: 156 lb 12.8 oz (71.1 kg) Height: 5' 5\" (1.651 m) PainSc:   0 - No pain Body mass index is 26.09 kg/m². Physical Examination:  
           General Appearance:  Well-developed, well-nourished, no acute  distress. HEENT:   
 Ears:  The TMs and ear canals were clear. Eyes:  The pupillary responses were normal.  Extraocular muscle function intact. Lids and conjunctiva not injected. Neck:  Supple without thyromegaly or adenopathy. No JVD noted. Lungs:  Clear to auscultation and percussion. Cardiac:  Regular rate and rhythm without murmur. GI: nontender w/o mass. Normal BS's. Extremities:  No clubbing, cyanosis or edema. Skin: tick bite. Neurological:  Grossly normal.     
 
 
 
Assessment/Plan:  
Impressions: ICD-10-CM ICD-9-CM 1. Dyslipidemia E78.5 272.4 LIPID PANEL  
   METABOLIC PANEL, COMPREHENSIVE  
   CK 2.  Long-term current use of high risk medication other than anticoagulant C46.917 F56.84 METABOLIC PANEL, COMPREHENSIVE  
   CK  
 3. Labile hypertension S06.24 896.1 METABOLIC PANEL, COMPREHENSIVE 4. Upper respiratory tract infection, unspecified type J06.9 465.9 5. Tick bite, initial encounter W57. XXXA 919.4 M946.4 Plan: 1. Continue present meds 2. Discussed lifestyle modifications including Na restriction, low carb/fat diet, weight reduction and exercise (at least a walking program). Follow-up and Dispositions · Return in about 1 month (around 5/12/2019). Orders Placed This Encounter  LIPID PANEL ([facility])  METABOLIC PANEL, COMPREHENSIVE ([facility])  CK ([facility])  doxycycline (VIBRAMYCIN) 100 mg capsule Sig: Take 1 Cap by mouth two (2) times a day. Dispense:  20 Cap Refill:  0  
 valsartan (DIOVAN) 80 mg tablet Sig: Take 1 Tab by mouth daily. Dispense:  30 Tab Refill:  5 Antonino Rivera MD

## 2019-04-12 NOTE — PROGRESS NOTES
Chief Complaint Patient presents with  Hypertension  Cholesterol Problem Depression Risk Factor Screening:  
 
3 most recent PHQ Screens 2019 Little interest or pleasure in doing things Not at all Feeling down, depressed, irritable, or hopeless Not at all Total Score PHQ 2 0 Functional Ability and Level of Safety: Activities of Daily Living ADL Assessment 10/24/2018 Feeding yourself No Help Needed Getting from bed to chair No Help Needed Getting dressed No Help Needed Bathing or showering No Help Needed Walk across the room (includes cane/walker) No Help Needed Using the telphone No Help Needed Taking your medications No Help Needed Preparing meals No Help Needed Managing money (expenses/bills) No Help Needed Moderately strenuous housework (laundry) No Help Needed Shopping for personal items (toiletries/medicines) No Help Needed Shopping for groceries No Help Needed Driving No Help Needed Climbing a flight of stairs No Help Needed Getting to places beyond walking distances No Help Needed Fall Risk Fall Risk Assessment, last 12 mths 10/24/2018 Able to walk? Yes Fall in past 12 months? No  
 
 
Abuse Screen Abuse Screening Questionnaire 10/24/2018 Do you ever feel afraid of your partner? [place] Marcial Are you in a relationship with someone who physically or mentally threatens you? [place] Marcial Is it safe for you to go home? Ashley Muhammad Reviewed record in preparation for visit and have obtained necessary documentation. Identified pt with two pt identifiers(name and ). Chief Complaint Patient presents with  Hypertension  Cholesterol Problem Wt Readings from Last 3 Encounters:  
19 156 lb 12.8 oz (71.1 kg) 19 159 lb (72.1 kg) 10/24/18 158 lb (71.7 kg) Temp Readings from Last 3 Encounters:  
19 97.5 °F (36.4 °C) (Oral) 19 97.1 °F (36.2 °C) (Oral) 10/24/18 98 °F (36.7 °C) (Oral) BP Readings from Last 3 Encounters:  
04/12/19 148/88  
01/21/19 (!) 140/98  
10/24/18 150/90 Pulse Readings from Last 3 Encounters:  
04/12/19 79  
01/21/19 69  
10/24/18 76 Coordination of Care Questionnaire: 
:  
 
1) Have you been to an emergency room, urgent care clinic since your last visit? No  
 
Hospitalized since your last visit? No  
 
 
     
 
2) Have you seen or consulted any other health care providers outside of [address] [place] [address] since your last visit? No  
 
 
 
 
 
Patient Care Team  
Patient Care Team: 
Sampson Vo MD as PCP - General (Internal Medicine)

## 2019-04-17 NOTE — PROGRESS NOTES
PHI verified. Patient informed that Dr. Korin Savage has reviewed lab results and stated Lipids excellent.  LDL 46  Total cholesterol 113.   Blood sugar, liver and kidney function normal.  CK (muscle enzyme) normal.  No change in medications or treatment.

## 2019-05-20 ENCOUNTER — OFFICE VISIT (OUTPATIENT)
Dept: INTERNAL MEDICINE CLINIC | Age: 69
End: 2019-05-20

## 2019-05-20 VITALS
TEMPERATURE: 97.5 F | SYSTOLIC BLOOD PRESSURE: 132 MMHG | BODY MASS INDEX: 25.83 KG/M2 | WEIGHT: 155 LBS | HEART RATE: 64 BPM | DIASTOLIC BLOOD PRESSURE: 76 MMHG | OXYGEN SATURATION: 96 % | HEIGHT: 65 IN

## 2019-05-20 DIAGNOSIS — I10 ESSENTIAL HYPERTENSION: Primary | ICD-10-CM

## 2019-05-20 DIAGNOSIS — E78.5 DYSLIPIDEMIA: ICD-10-CM

## 2019-05-20 LAB
ANION GAP SERPL CALC-SCNC: 13 MMOL/L
BUN SERPL-MCNC: 11 MG/DL (ref 7–17)
CALCIUM SERPL-MCNC: 9.5 MG/DL (ref 8.4–10.2)
CHLORIDE SERPL-SCNC: 106 MMOL/L (ref 98–107)
CO2 SERPL-SCNC: 22 MMOL/L (ref 22–32)
CREAT SERPL-MCNC: 0.6 MG/DL (ref 0.7–1.2)
GLUCOSE SERPL-MCNC: 92 MG/DL (ref 65–105)
POTASSIUM SERPL-SCNC: 4.6 MMOL/L (ref 3.6–5)
SODIUM SERPL-SCNC: 141 MMOL/L (ref 137–145)

## 2019-05-20 NOTE — PROGRESS NOTES
Reviewed record in preparation for visit and have obtained necessary documentation. Identified pt with two pt identifiers(name and ). Chief Complaint   Patient presents with    Blood Pressure Check        Coordination of Care Questionnaire:  :     1) Have you been to an emergency room, urgent care clinic since your last visit? No     Hospitalized since your last visit? No               2) Have you seen or consulted any other health care providers outside of [address] [place] [address] since your last visit?  No

## 2019-05-20 NOTE — PROGRESS NOTES
PHI verified. Patient informed that Dr. Joceline Garza has reviewed lab results and stated Blood sugar, kidney and K+ normal.  No change in medications or treatment.

## 2019-11-01 DIAGNOSIS — E78.5 DYSLIPIDEMIA: ICD-10-CM

## 2019-11-01 RX ORDER — VALSARTAN 80 MG/1
TABLET ORAL
Qty: 30 TAB | Refills: 5 | Status: SHIPPED | OUTPATIENT
Start: 2019-11-01

## 2019-11-01 RX ORDER — ATORVASTATIN CALCIUM 20 MG/1
20 TABLET, FILM COATED ORAL DAILY
Qty: 30 TAB | Refills: 0 | Status: CANCELLED | OUTPATIENT
Start: 2019-11-01

## 2019-11-01 RX ORDER — VALSARTAN 80 MG/1
80 TABLET ORAL DAILY
Qty: 30 TAB | Refills: 0 | Status: SHIPPED | OUTPATIENT
Start: 2019-11-01

## 2019-11-01 NOTE — TELEPHONE ENCOUNTER
PCP: Severiano Perkins, MD    Last appt: 5/20/2019  Future Appointments   Date Time Provider Alysha Richardson   11/11/2019 10:00 AM Isaac Beth MD [address] [facility] [address]       Requested Prescriptions     Pending Prescriptions Disp Refills    valsartan (DIOVAN) 80 mg tablet 30 Tab 0     Sig: Take 1 Tab by mouth daily.

## 2019-11-01 NOTE — TELEPHONE ENCOUNTER
Requested Prescriptions     Pending Prescriptions Disp Refills    atorvastatin (LIPITOR) 20 mg tablet 30 Tab 0     Sig: Take 1 Tab by mouth daily.        Last Refill: 4/12/19  Next Appointment:11/11/19

## 2019-11-01 NOTE — TELEPHONE ENCOUNTER
PCP: Matteo Mills MD    Last appt: 5/20/2019  Future Appointments   Date Time Provider Alysha Richardson   11/11/2019 10:00 AM Emma Pepper MD [address]       Requested Prescriptions     Pending Prescriptions Disp Refills    atorvastatin (LIPITOR) 20 mg tablet 30 Tab 0     Sig: Take 1 Tab by mouth daily.

## 2019-11-01 NOTE — TELEPHONE ENCOUNTER
Requested Prescriptions     Pending Prescriptions Disp Refills    valsartan (DIOVAN) 80 mg tablet 30 Tab 0     Sig: Take 1 Tab by mouth daily.        Last Refill: 4/12/19  Next Appointment:11/11/19

## 2019-11-05 DIAGNOSIS — E78.5 DYSLIPIDEMIA: ICD-10-CM

## 2019-11-05 RX ORDER — ATORVASTATIN CALCIUM 20 MG/1
TABLET, FILM COATED ORAL
Qty: 90 TAB | Refills: 3 | Status: SHIPPED | OUTPATIENT
Start: 2019-11-05

## 2021-01-25 ENCOUNTER — IMMUNIZATION (OUTPATIENT)
Dept: INTERNAL MEDICINE CLINIC | Age: 71
End: 2021-01-25
Payer: MEDICARE

## 2021-01-25 DIAGNOSIS — Z23 ENCOUNTER FOR IMMUNIZATION: Primary | ICD-10-CM

## 2021-01-25 PROCEDURE — 0011A PR IMM ADMN SARSCOV2 100 MCG/0.5 ML 1ST DOSE: CPT | Performed by: FAMILY MEDICINE

## 2021-01-25 PROCEDURE — 91301 COVID-19, MRNA, LNP-S, PF, 100MCG/0.5ML DOSE(MODERNA): CPT | Performed by: FAMILY MEDICINE

## 2021-02-22 ENCOUNTER — IMMUNIZATION (OUTPATIENT)
Dept: INTERNAL MEDICINE CLINIC | Age: 71
End: 2021-02-22
Payer: MEDICARE

## 2021-02-22 DIAGNOSIS — Z23 ENCOUNTER FOR IMMUNIZATION: Primary | ICD-10-CM

## 2021-02-22 PROCEDURE — 0012A COVID-19, MRNA, LNP-S, PF, 100MCG/0.5ML DOSE(MODERNA): CPT | Performed by: FAMILY MEDICINE

## 2021-02-22 PROCEDURE — 91301 COVID-19, MRNA, LNP-S, PF, 100MCG/0.5ML DOSE(MODERNA): CPT | Performed by: FAMILY MEDICINE

## 2022-03-18 PROBLEM — Z00.00 ANNUAL PHYSICAL EXAM: Status: ACTIVE | Noted: 2017-10-10

## 2022-03-18 PROBLEM — K80.20 CALCULUS OF GALLBLADDER WITHOUT CHOLECYSTITIS WITHOUT OBSTRUCTION: Status: ACTIVE | Noted: 2019-01-20

## 2022-03-18 PROBLEM — I10 ESSENTIAL HYPERTENSION: Status: ACTIVE | Noted: 2019-01-20

## 2022-03-19 PROBLEM — N60.19 FIBROCYSTIC BREAST DISEASE: Status: ACTIVE | Noted: 2019-01-20

## 2022-03-19 PROBLEM — E78.5 DYSLIPIDEMIA: Status: ACTIVE | Noted: 2019-01-20

## 2022-03-19 PROBLEM — R55 VASOVAGAL SYNCOPE: Status: ACTIVE | Noted: 2019-01-20

## 2022-03-19 PROBLEM — M85.80 OSTEOPENIA: Status: ACTIVE | Noted: 2017-08-23

## 2022-03-19 PROBLEM — Z79.899 LONG-TERM CURRENT USE OF HIGH RISK MEDICATION OTHER THAN ANTICOAGULANT: Status: ACTIVE | Noted: 2019-01-21

## 2023-05-19 RX ORDER — VALSARTAN 80 MG/1
80 TABLET ORAL DAILY
COMMUNITY
Start: 2019-11-01

## 2023-05-19 RX ORDER — ATORVASTATIN CALCIUM 20 MG/1
1 TABLET, FILM COATED ORAL DAILY
COMMUNITY
Start: 2019-11-05

## 2024-02-26 ENCOUNTER — TRANSCRIBE ORDERS (OUTPATIENT)
Facility: HOSPITAL | Age: 74
End: 2024-02-26

## 2024-02-26 DIAGNOSIS — M81.0 AGE-RELATED OSTEOPOROSIS WITHOUT CURRENT PATHOLOGICAL FRACTURE: Primary | ICD-10-CM

## 2024-02-26 DIAGNOSIS — Z12.31 SCREENING MAMMOGRAM FOR BREAST CANCER: Primary | ICD-10-CM

## 2024-02-26 DIAGNOSIS — M80.00XA AGE-RELATED OSTEOPOROSIS WITH CURRENT PATHOLOGICAL FRACTURE, INITIAL ENCOUNTER: ICD-10-CM

## 2024-03-06 ENCOUNTER — HOSPITAL ENCOUNTER (OUTPATIENT)
Facility: HOSPITAL | Age: 74
Discharge: HOME | End: 2024-03-09
Payer: MEDICARE

## 2024-03-06 DIAGNOSIS — M81.0 AGE-RELATED OSTEOPOROSIS WITHOUT CURRENT PATHOLOGICAL FRACTURE: ICD-10-CM

## 2024-03-06 DIAGNOSIS — Z12.31 SCREENING MAMMOGRAM FOR BREAST CANCER: ICD-10-CM

## 2024-03-06 PROCEDURE — 77080 DXA BONE DENSITY AXIAL: CPT

## 2024-03-06 PROCEDURE — 77063 BREAST TOMOSYNTHESIS BI: CPT

## 2025-07-31 ENCOUNTER — TRANSCRIBE ORDERS (OUTPATIENT)
Facility: HOSPITAL | Age: 75
End: 2025-07-31

## 2025-07-31 DIAGNOSIS — Z12.31 OTHER SCREENING MAMMOGRAM: Primary | ICD-10-CM
